# Patient Record
Sex: FEMALE | Race: WHITE
[De-identification: names, ages, dates, MRNs, and addresses within clinical notes are randomized per-mention and may not be internally consistent; named-entity substitution may affect disease eponyms.]

---

## 2016-02-25 VITALS
SYSTOLIC BLOOD PRESSURE: 117 MMHG | DIASTOLIC BLOOD PRESSURE: 93 MMHG | DIASTOLIC BLOOD PRESSURE: 93 MMHG | SYSTOLIC BLOOD PRESSURE: 117 MMHG | DIASTOLIC BLOOD PRESSURE: 93 MMHG | DIASTOLIC BLOOD PRESSURE: 93 MMHG | SYSTOLIC BLOOD PRESSURE: 117 MMHG | SYSTOLIC BLOOD PRESSURE: 117 MMHG

## 2019-12-27 ENCOUNTER — HOSPITAL ENCOUNTER (OUTPATIENT)
Dept: HOSPITAL 61 - ECHO | Age: 64
Discharge: HOME | End: 2019-12-27
Attending: INTERNAL MEDICINE
Payer: MEDICARE

## 2019-12-27 DIAGNOSIS — I11.9: Primary | ICD-10-CM

## 2019-12-27 DIAGNOSIS — E78.5: ICD-10-CM

## 2019-12-27 DIAGNOSIS — Z87.891: ICD-10-CM

## 2019-12-27 DIAGNOSIS — I48.91: ICD-10-CM

## 2019-12-27 PROCEDURE — 93306 TTE W/DOPPLER COMPLETE: CPT

## 2019-12-27 NOTE — CARD
MR#: [id number]

Account#: [id number]

Accession#: [id number]

Date of Study: 12/27/2019

Ordering Physician: KASI GENAO, 

Referring Physician: KASI GENAO, 

Tech: Jessica House





--------------- APPROVED REPORT --------------





EXAM: Two-dimensional and M-mode echocardiogram with Doppler and color Doppler.



Other Information 

Quality : AverageHR: 51bpm



INDICATION

Atrial Fibrillation

Murmur 



RISK FACTORS

Hypertension 

Hyperlipidemia

Previous smoker



2D DIMENSIONS 

RVDd3.8 (2.9-3.5cm)Left Atrium(2D)3.9 (1.6-4.0cm)

IVSd1.2 (0.7-1.1cm)Aortic Root(2D)3.2 (2.0-3.7cm)

LVDd4.6 (3.9-5.9cm)LVOT Diameter2.2 (1.8-2.4cm)

PWd1.0 (0.7-1.1cm)LVDs3.1 (2.5-4.0cm)

FS (%) 32.6 %SV59.8 ml



Aortic Valve

AoV Peak Vikash.167.0cm/sAoV VTI36.4cm

AO Peak GR.11.2mmHgLVOT Peak Vikash.128.3cm/s

LVOT  VTI 28.97cmAO Mean GR.5mmHg

MARY JO (VMAX)2.89lk6TZK   (VTI)2.98cm2

AI P 1/2 Pczc781kq



Mitral Valve

MV E Kssqzeyh69.3cm/sMV DECEL [id number]

MV A Prgupaqa58.0cm/sMV E Mean Gr.2mmHg

MV IFE61ewC/A  Ratio1.0

MVA (PHT)3.85cm2



TDI

E/Lateral E'13.7E/Medial E'14.6



Pulmonary Valve

PV Peak Peecgvpq131.9cm/sPV Peak Grad.5mmHg



Tricuspid Valve

TR P. Xxueszil540ut/sRAP ZXBSEBRM4keCq

TR Peak Gr.40znNuYISH40xkSe



Pulmonary Vein

S1 Yykfdxbp45.4cm/sD2 Uqdxrsrt10.9cm/s

PVa jcfvfzdc597tpby



 LEFT VENTRICLE 

The left ventricle is normal size. There is mild concentric left ventricular hypertrophy. The left ve
ntricular systolic function is normal and the ejection fraction is within normal range. The Ejection 
Fraction is 55-60%. There is normal LV segmental wall motion. Transmitral Doppler flow pattern is Gra
de I-abnormal relaxation pattern.



 RIGHT VENTRICLE 

The right ventricle is normal size. There is normal right ventricular wall thickness. The right ventr
icular systolic function is normal.



 ATRIA 

The left atrium size is normal. The right atrium size is normal. The interatrial septum is intact wit
h no evidence for an atrial septal defect or patent foramen ovale as noted on 2-D or Doppler imaging.




 AORTIC VALVE 

The aortic valve is thickened but opens well. Doppler and Color Flow revealed trace aortic regurgitat
ion. There is no significant aortic valvular stenosis.



 MITRAL VALVE 

The mitral valve is normal in structure and function. There is no evidence of mitral valve prolapse. 
There is no mitral valve stenosis. Doppler and Color-flow revealed trace mitral regurgitation.



 TRICUSPID VALVE 

The tricuspid valve is normal in structure and function. Doppler and Color Flow revealed trace tricus
pid regurgitation with an estimated PAP of 32 mmHg. There is no tricuspid valve stenosis.



 PULMONIC VALVE 

The pulmonic valve is not well visualized. Doppler and Color Flow revealed trace pulmonic valvular re
gurgitation.



 GREAT VESSELS 

The aortic root is normal in size. The IVC is normal in size and collapses >50% with inspiration.



 PERICARDIAL EFFUSION 

There is no evidence of significant pericardial effusion.



Critical Notification

Critical Value: No



<Conclusion>

The left ventricle is normal size.

The left ventricular systolic function is normal and the ejection fraction is within normal range.

The Ejection Fraction is 55-60%.

There is mild concentric left ventricular hypertrophy.

Doppler and Color Flow revealed trace aortic regurgitation.

There is no significant aortic valvular stenosis.

Doppler and Color-flow revealed trace mitral regurgitation.

Doppler and Color Flow revealed trace tricuspid regurgitation with an estimated PAP of 32 mmHg.



Signed by : Kasi Genao MD

Electronically Approved : 12/27/2019 11:06:07

## 2020-06-02 ENCOUNTER — HOSPITAL ENCOUNTER (OUTPATIENT)
Dept: HOSPITAL 61 - MAMMO | Age: 65
Discharge: HOME | End: 2020-06-02
Attending: INTERNAL MEDICINE
Payer: MEDICARE

## 2020-06-02 DIAGNOSIS — Z12.31: Primary | ICD-10-CM

## 2020-06-02 PROCEDURE — 77067 SCR MAMMO BI INCL CAD: CPT

## 2020-06-02 PROCEDURE — 77063 BREAST TOMOSYNTHESIS BI: CPT

## 2020-06-02 NOTE — RAD
DATE: 6/2/2020 9:16 AM



EXAM: MAMMO DINESH SCREENING BILATERAL



HISTORY:  Screening



COMPARISON: 9/15/2017



Bilateral CC and MLO views of the breasts were performed. Bilateral breast

tomosynthesis was performed in CC and MLO projections.



This study was interpreted with the benefit of Computerized Aided Detection

(CAD).





FINDINGS:



Breast Density: FATTY  The Breast Parenchyma is primarily fatty replaced.

Breast parenchyma level density A.





No suspicious masses, microcalcifications or architectural distortion is

present to suggest malignancy in either breast.





The visualized axillae are unremarkable. 



IMPRESSION: No mammographic evidence of malignancy. 



BI-RADS CATEGORY: 1 NEGATIVE



RECOMMENDED FOLLOW-UP: 12M 12 MONTH FOLLOW-UP Annual screening mammography is

recommended, unless clinically indicated sooner based on symptoms or change in

physical exam.



PQRS compliance statement: Patient information was entered into a reminder

system with a target due date 6/3/2021 for the next mammogram.



Mammography is a sensitive method for finding small breast cancers, but it

does not detect them all and is not a substitute for careful clinical

examination.  A negative mammogram does not negate a clinically suspicious

finding and should not result in delay in biopsying a clinically suspicious

abnormality.



"Our facility is accredited by the American College [employer] Mammography

Program."

## 2020-07-14 ENCOUNTER — HOSPITAL ENCOUNTER (OUTPATIENT)
Dept: HOSPITAL 61 - KCIC MRI | Age: 65
Discharge: HOME | End: 2020-07-14
Attending: ORTHOPAEDIC SURGERY
Payer: MEDICARE
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
## 2020-07-14 DIAGNOSIS — M43.12: ICD-10-CM
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
## 2020-07-14 DIAGNOSIS — S13.160A: Primary | ICD-10-CM

## 2020-07-14 DIAGNOSIS — M21.821: ICD-10-CM
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
## 2020-07-14 DIAGNOSIS — M48.02: ICD-10-CM
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
## 2020-07-14 DIAGNOSIS — M50.31: ICD-10-CM

## 2020-07-14 DIAGNOSIS — M75.101: ICD-10-CM

## 2020-07-14 PROCEDURE — 73221 MRI JOINT UPR EXTREM W/O DYE: CPT

## 2020-07-14 PROCEDURE — 72141 MRI NECK SPINE W/O DYE: CPT

## 2020-07-14 NOTE — KCIC
MRI Cervical Spine Without Contrast

 

History:Neck pain for one year, right shoulder pain for 6 to 8 months

 

Technique: Multiplanar, multi sequential noncontrast MR imaging was 

performed of the cervical spine.

 

Comparison: None

 

Findings: There is motion degradation. Cervical cord caliber is within 

normal limits without defined or expansile signal abnormality. Cervical 

vertebral body stature is maintained. There is very minimal posterior 

subluxation of C5 relative to C6 and minimal grade 1 anterior 

spondylolisthesis C3-4, negligible anterior spondylolisthesis at C4-5. 

There is moderate to severe degenerative disc disease C5-6, minimally 

C3-4, mild disc desiccation C4-5 and C6-7. There is anterior annular tear 

at C4-5. There is no significant marrow edema.

 

C2-C3:  Neural foramina and spinal canal are adequate.

 

C3-C4:  There is severe left facet degenerative change. There is mild left

uncovertebral degenerative change. There is moderate to severe narrowing 

of the left neural foramen. Right neural foramen and spinal canal are 

adequate. 

 

C4-C5:  There is very shallow posterior central protrusion. Central canal 

is borderline about 10 mm. There is facet degenerative change greater on 

the left. Neural foramina are not significantly narrowed, very mild 

narrowing on the right.

 

C5-C6:  There is broad posterior protrusion about 2 to 3 mm AP. Central 

canal is narrowed to about 8 to 9 mm. There is bilateral facet 

degenerative change. There is uncovertebral degenerative change greater on

the right. Poorly characterized due to motion, there is probably a small 

protrusion at the anterior proximal aspect of the right neural foramen. 

There is suspected moderate to severe narrowing of the proximal right 

neural foramen. There is likely mild narrowing of the left neural foramen.

 

C6-C7:   There is disc osteophyte complex and superimposed about 2 mm AP 

protrusion with mild indentation upon the ventral thecal sac greatest 

centrally. Central canal is minimally narrowed about 9 mm. There is facet 

degenerative change. There is mild narrowing of the left neural foramen, 

right neural foramen overall adequate.

 

C7-T1:  Neural foramina and spinal canal are adequate.

 

Impression: 

1.  There is degenerative disc disease greatest C5-6. There is spinal 

stenosis about 8 to 9 mm at C5-6 and to a somewhat lesser degree at C6-7.

2. Facet and uncovertebral degenerative change contributes to neural 

foramina compromise as stated greatest on the left at C3-4 and on the 

right at C5-6 although there is also possible small protrusion at the 

anterior proximal aspect of the right C5-6 neural foramen.

3. There is multilevel mild abnormal alignment as stated.

 

Electronically signed by: Yaya Hearn MD (7/14/2020 9:25 AM) [id number]

## 2020-07-14 NOTE — KCIC
EXAM: MRI right shoulder

 

DATE: 7/14/2020 8:45 AM

 

COMPARISON: 7/6/2020

 

INDICATION: RIGHT SHOULDER PAIN-Right shoulder pain x 6-8 months.

 

TECHNIQUE: Multiplanar, multisequence MRI of the right shoulder was 

performed without contrast.

 

FINDINGS:

AC joint degenerative changes are seen. Type II acromion. No os acromiale.

 

 

Subacromial subdeltoid bursal fluid from full-thickness rotator cuff tear 

described below. There is a very small right shoulder joint effusion.

 

There is a full-thickness, subtotal width tear of the supraspinatus 

tendon. Anteriorly the fluid defect measures 1.5 cm in AP dimension 

posteriorly, heterogeneous signal is seen, likely representing scar tissue

extending additional 1.6 cm. Tendinous retraction to the AC joint There is

also a partial-thickness articular sided tear of the infraspinatus tendon 

posteriorly measuring approximately 7 mm in AP dimension. Subscapularis 

and infraspinatus tendinosis.

 

Extra-articular long head biceps tendon is seen within the bicipital 

groove. Increased signal and thickening of the intra-articular segment of 

the long head biceps tendon consistent with moderate tendinosis.

 

Diffuse deformity of the labrum likely from chronic degeneration although 

no obvious acute tear is identified. Chondral thinning anteriorly within 

the glenoid. No fracture or osteonecrosis.

 

IMPRESSION: 

1.  Full-thickness, subtotal width tear of the supraspinatus tendon with 

heterogeneous scar tissue posteriorly.

2.  Partial-thickness articular sided tear of the posterior infraspinatus 

tendon involving approximately 50 percent tendon thickness

3.  Intra-articular long head biceps tendinosis.

 

Electronically signed by: Liu Johnston MD (7/14/2020 9:56 AM) [id number]

## 2020-09-08 ENCOUNTER — HOSPITAL ENCOUNTER (OUTPATIENT)
Dept: HOSPITAL 61 - LAB | Age: 65
Discharge: HOME | End: 2020-09-08
Attending: ORTHOPAEDIC SURGERY
Payer: MEDICARE

## 2020-09-08 DIAGNOSIS — Z20.828: Primary | ICD-10-CM

## 2020-09-11 ENCOUNTER — HOSPITAL ENCOUNTER (OUTPATIENT)
Dept: HOSPITAL 61 - SURG | Age: 65
Discharge: HOME | End: 2020-09-11
Attending: ORTHOPAEDIC SURGERY
Payer: MEDICARE

## 2020-09-11 VITALS — BODY MASS INDEX: 25.28 KG/M2 | WEIGHT: 149.91 LBS | HEIGHT: 64.5 IN

## 2020-09-11 VITALS
DIASTOLIC BLOOD PRESSURE: 56 MMHG | DIASTOLIC BLOOD PRESSURE: 56 MMHG | DIASTOLIC BLOOD PRESSURE: 56 MMHG | SYSTOLIC BLOOD PRESSURE: 128 MMHG | SYSTOLIC BLOOD PRESSURE: 128 MMHG | DIASTOLIC BLOOD PRESSURE: 56 MMHG | SYSTOLIC BLOOD PRESSURE: 128 MMHG | SYSTOLIC BLOOD PRESSURE: 128 MMHG

## 2020-09-11 DIAGNOSIS — Z79.82: ICD-10-CM

## 2020-09-11 DIAGNOSIS — M67.813: ICD-10-CM

## 2020-09-11 DIAGNOSIS — Z87.891: ICD-10-CM

## 2020-09-11 DIAGNOSIS — I11.0: ICD-10-CM

## 2020-09-11 DIAGNOSIS — M75.121: Primary | ICD-10-CM

## 2020-09-11 DIAGNOSIS — E78.5: ICD-10-CM

## 2020-09-11 DIAGNOSIS — M19.011: ICD-10-CM

## 2020-09-11 DIAGNOSIS — Z79.899: ICD-10-CM

## 2020-09-11 DIAGNOSIS — M75.41: ICD-10-CM

## 2020-09-11 DIAGNOSIS — I50.9: ICD-10-CM

## 2020-09-11 PROCEDURE — 29826 SHO ARTHRS SRG DECOMPRESSION: CPT

## 2020-09-11 PROCEDURE — 29824 SHO ARTHRS SRG DSTL CLAVICLC: CPT

## 2020-09-11 PROCEDURE — C1782 MORCELLATOR: HCPCS

## 2020-09-11 PROCEDURE — 23410 REPAIR ROTATOR CUFF ACUTE: CPT

## 2020-09-11 PROCEDURE — C1713 ANCHOR/SCREW BN/BN,TIS/BN: HCPCS

## 2020-09-11 PROCEDURE — A7015 AEROSOL MASK USED W NEBULIZE: HCPCS

## 2020-09-11 PROCEDURE — 23430 REPAIR BICEPS TENDON: CPT

## 2020-09-11 NOTE — PDOC4
Operative Note


Operative Note


Date of Procedure:  September 11, 2020


Pre-Op Diagnosis:   


* Complete tear of right rotator cuff, unspecified whether traumatic - M75.121 


* Biceps tendinosis of right shoulder - M67.813 


* Impingement syndrome of right shoulder - M75.41


* Primary osteoarthritis, right shoulder acromioclavicular joint - M19.011  


Post-Op Diagnosis:   


* Complete tear of right rotator cuff, unspecified whether traumatic - M75.121 


* Biceps tendinosis of right shoulder - M67.813 


* Impingement syndrome of right shoulder - M75.41


* Primary osteoarthritis, right shoulder acromioclavicular joint - M19.011  


Procedure:      


* Right shoulder, repair of ruptured musculotendinous cuff (rotator cuff) open, 

  acute CPT 70268


* Right shoulder open biceps tenodesis CPT 47885


* Arthroscopy, shoulder, surgical; decompression of subacromial space with 

  partial acromioplasty CPT 23097


* Arthroscopy, shoulder, surgical; distal claviculectomy including distal 

  articular surface (Donna procedure) CPT 44245


Surgeon:  Patricia Newman MD


Assistant:  ROB Haro


Anesthesia:  General


EBL:  50 mL


Specimens Obtained:  none


Complications:  none


Drains:  none





Findings: Full-thickness rotator cuff tear of the supraspinatus tendon and 

partial tearing of the infraspinatus tendon.  Impingement occurring due to 

prominent acromion.  Prominent arthritic acromioclavicular joint.  She had bony 

prominences and spurs at the humeral footprint which were removed with a 

rongeurs.





Implants: Arthrex swivel lock anchors 4.75 mm x 4, Arthrex 7 mm Biceps Tenodesis

Swivelock.





Indications for Procedure: 65 year old woman with progressive right shoulder 

pain over the past 8-12 months. Patient notices pain sleeping on her side, 

reaching overhead, and holding a gallon of milk.  On examination she has 

impingement syndrome, probable rotator cuff tear, bicipital tenosynovitis, and 

painful acromioclavicular joint osteoarthritis which is also causing 

impingement.  I recommended arthroscopy with subacromial decompression, distal 

clavicle excision, biceps tenodesis and rotator cuff repair.  There is a chance 

the cuff repair will not be repairable and I discussed the possibility of using 

an allograft and perform superior capsule reconstruction if the tear cannot be 

repaired.  We discussed the potential risks of infection, neurovascular injury, 

fracture, bleeding, or other potential surgical or anesthetic complications. We 

also discussed healing expectations including postoperative use of DonJoy sling,

need for physical therapy, and refrain from overhead lifting for 3 months; I 

described that this is a more difficult surgery than many common surgeries, and 

she may take up to a year to heal. All of her questions were answered and she 

desires to proceed with surgery.





Procedure in Detail: The patient was identified in the preoperative holding 

area.  The correct right shoulder was marked by me.  The patient was taken to 

the operating room where general anesthesia was used.  The patient was 

positioned in the beachchair position with the bony prominences well-padded and 

the eyes protected. Preoperative antibiotics were given intravenously.  A 

timeout procedure was performed. Under sterile technique 20 mL of bupivacaine 

with epinephrine was injected into the subacromial space and glenohumeral joint.

The limb was then thoroughly prepared with surgical ChloraPrep solution 

circumferentially. Sterile waterproof arthroscopy shoulder drapes were applied, 

along with an impervious stockinette over the arm, and a Spider arm beltran.





Posterior, posterolateral, lateral, and anterior arthroscopy portals were used. 

The glenohumeral joint showed normal articular surfaces with minor labral 

degeneration but no need for extensive debridement.  The biceps tendon was 

palpated with the shaver, and retracted into the joint, and the tendon is 

visually torn about FPC through, and the remaining tendon is of poor quality

with tendinosis.  I completed the partial tear of the biceps with the [employer] 

Edge thermal energy bipolar device in preparation for the tenodesis.  The distal

portion of the supraspinatus is detached from the humeral head, and the 

footprint is exposed.  This is a full-thickness tear of the supraspinatus.  





The subacromial space was entered. The anterior acromion was prominent and the 

subacromial space was narrowed.  The [employer] Edge thermal energy bipolar device 

was used for hemostasis and to resect the undersurface periosteum exposing the 

prominent anterior acromion.  A 6.0 mm oval antolin was used for the acromioplasty.

A three-stage acromioplasty was performed, with the antolin first laterally, 

removing anterior acromion, using the distal clavicle as a reference. The antolin 

was then placed in the posterior portal, and a cutting block technique was used 

for smoothing of the lateral edge of the acromion tapering the anterior acromion

into a Bigliani type I configuration. Final smoothing of the acromion was 

performed with the antolin again in the lateral portal, and direct arthroscopic 

visualization. The impingement of the subacromial space was now nicely 

decompressed. 





No further impingement appears to be occurring from the acromion, however the 

arthritic distal clavicle is degenerative with an osteoarthritic distal clavicle

articular surface.  The antolin was used to resect the entire articular surface of 

the distal clavicle and 10 mm of distal clavicle bone, completing the [place] 

arthroscopic distal clavicle excision.  The [employer] thermal energy device 

was used for hemostasis.





The cuff tear was easily visualized and appears repairable, without need for 

superior capsule reconstruction.  The arthroscopic instruments were removed. 

Antibiotics were redosed. Outer gloves were changed. The skin was prepared a 

second time with ChloraPrep solution. An anterior lateral deltoid raphae 

splinting incision was used. Care was made not to extend more than 4 cm distally

so as to avoid axillary nerve injury. Self-retaining retractors were placed. My 

assistant used an Dubakiy retractor in addition to the self-retaining 

retractors.  





A rongeurs and a bone punch were used to decorticate the supraspinatus 

footprint, and create a "crimson duvet".  The initial portion of the repair was 

from the supraspinatus to the infraspinatus in a side-to-side fashion.  I used 

the scorpion device to place figure-of-eight sutures, repairing the posterior V 

split into the infraspinatus and the side to side fashion for secure repair of 

that infraspinatus tear, and now leaving essentially a large crescent tear of 

the supraspinatus and very anterior edge of the infraspinatus to the footprint. 

The 2 medial 4.75 mm swivel lock anchors with swaged suture tapes were used, but

due to her soft bone I used a smaller bone punch for all of the anchor 

placements.  All of the medial sutures were deployed with Arthrex scorpion 

device, about 16 mm from the distal edge of the cuff.  The medial mattress 

sutures were secured and tied, and Obey my assistant held tension reducing the 

cuff while the sutures were tied, and these medial mattress sutures were placed 

in a ripstop pattern to prevent the tapes from tearing through the abnormal cuff

tissue.  It would have been impossible to debride and remove all of the abnormal

tissue as there would have been no cuff left to repair.  The swaged suture tapes

were then trimmed, the tapes were crossed, and the 2 lateral row anchors were 

now placed on the humeral cortex for secure speed bridge repair.  The additional

sutures from the lateral row anchors were used anteriorly and posteriorly to 

secure the edges of the dog ear of the tear. A secure and tension-free repair 

was obtained. The shoulder was taken through a range of motion, and the repair 

security confirmed.





Copious saline irrigation was used. I closed the fascia of the deltoid with #1 

Vicryl suture in a figure-of-eight fashion. My assistant Mitchel then completed 

the subcutaneous closure with 2-0 Vicryl.  He closed the portals with #3-0 

Prolene.  He repaired the skin incision with #3-0 Stratafix, Mastisol and Steri-

Strips. He injected an additional 30 mL of bupivacaine with epinephrine.  

Xeroform was used over the portals.  A bulky sterile dressing was applied.  A 

DonJoy UltraSling was applied.  There were no apparent complications.











PATRICIA NEWMAN MD                 Sep 11, 2020 10:08

## 2021-06-07 ENCOUNTER — HOSPITAL ENCOUNTER (OUTPATIENT)
Dept: HOSPITAL 61 - MAMMO | Age: 66
End: 2021-06-07
Attending: INTERNAL MEDICINE
Payer: MEDICARE

## 2021-06-07 DIAGNOSIS — Z12.31: Primary | ICD-10-CM

## 2021-06-07 PROCEDURE — 77063 BREAST TOMOSYNTHESIS BI: CPT

## 2021-06-07 PROCEDURE — 77067 SCR MAMMO BI INCL CAD: CPT

## 2021-06-07 NOTE — RAD
EXAM: BILATERAL DIGITAL SCREENING MAMMOGRAPHY.



HISTORY: Routine mammographic screening. 



TECHNIQUE: Bilateral full field digital images were obtained in CC and MLO projections. Computer-aide
d detection was applied.



COMPARISON: 06/02/2020, 09/15/2017, 01/27/2017.



COMPOSITION: B. There are scattered areas of fibroglandular density.



FINDINGS: Circumscribed nodules bilaterally are stable and benign. A coarse calcification on the left
 is benign. There are no suspicious masses, microcalcifications or architectural distortion. The pare
nchymal pattern is stable.



BI-RADS CATEGORY 2: Benign.



RECOMMENDATION:

1. Routine screening mammography in one year.



If mammography demonstrates dense breast tissue (heterogenously dense or extremely dense, category C 
or D), which could hide abnormalities, and if other risk factors for breast cancer have been identifi
ed, supplemental screening tests that may be suggested by the ordering physician may be of benefit. D
ense breast tissue, in and of itself, is a relatively common condition. Therefore, this information i
s not provided to cause undue concern, but rather to raise awareness and to promote discussion with t
he referring physician regarding the presence of other risk factors, in addition to dense breast tiss
ue. The results of this mammography examination is provided to the patient and referring physician. T
he patient should contact their referring physician if any questions or concerns exist regarding this
 report.



PQRS compliance statement -  Patient information was entered into a reminder system with a target due
 date for the next mammogram. 



"Our facility is accredited by the American College of [employer] Mammography Program."



Electronically signed by: CYNTHIA Martinez MD (6/7/2021 12:17 PM) [facility]

## 2021-07-06 ENCOUNTER — HOSPITAL ENCOUNTER (OUTPATIENT)
Dept: HOSPITAL 61 - NM | Age: 66
End: 2021-07-06
Attending: INTERNAL MEDICINE
Payer: MEDICARE

## 2021-07-06 DIAGNOSIS — R07.89: Primary | ICD-10-CM

## 2021-07-06 PROCEDURE — 78452 HT MUSCLE IMAGE SPECT MULT: CPT

## 2021-07-06 PROCEDURE — A9500 TC99M SESTAMIBI: HCPCS

## 2021-07-06 PROCEDURE — 93017 CV STRESS TEST TRACING ONLY: CPT

## 2021-07-06 NOTE — RAD
MR#: [id number]

Account#: [id number]

Accession#: [id number]

Date of Study: 07/06/2021

Ordering Physician: KASI ENRIQUEZ, 

Referring Physician: SAMUEL LAROSE Tech: RT JOHN Cononrs) (N)





--------------- APPROVED REPORT --------------





Test Type:          Pharmacological

Stress Nurse/Tech: SAUL Boo RN

Test Indications: Chest Pressure and Left shoulder pain

Cardiac History: HTN, See EMR.

Medications:     See EMR.

Medical History: X-Smoker=Quit 10 yrs ago, See EMR.

Resting ECG:     SR

Resting Heart Rate: 51 bpm

Resting Blood Pressure: 129/57mmHg

Pretest Chest Pain: No chest pain



Nurse/Tech Notes

Lungs CTA, Heart tones regular.

Consent: The procedure was explained to the patient in lay terms. Informed consent was witnessed. Dillan
eout was entered into [employer]. History and Stress Test performed by RT Waylon (R) (N)



Pharm. Details

Pharmacologic stress testing was performed using 0.4mg per 5ml of regadenoson given intravenously ove
r 7-10 seconds.





POST EXERCISE

Reason for Termination: Infusion complete

Max HR: 83 bpm

Max Blood Pressure: 134/56mmHg

Blood Pressure response to exercise: Normal blood pressure response during stress.

Heart Rate response to exercise: WNL

Chest Pain: No. 

Arrhythmia: No. 

ST Change: No. 



INTERPRETATION

Stress EKG Conclusion: No evidence of stress induced EKG changes. 



Imaging Protocol

IMAGE PROTOCOL: Rest Tc-99m/stress Tc-99m 1 day



Rest:            Stress:         Viability:   

Radiopharm.Tc99m DszzrehlnOb78k Sestamibi

Dose10.5mCi            30.8mCi            

Duration    13min.           13min.           

Img Date  07/06/2021 07/06/2021      

Inj-Img [id number].           60min.           



Rest Admin Site:IV - Right AntecubitalAdministrator:RT Waylon (R)(N)

Stress Admin Site: IV - Right AntecubitalAdministrator: RT Waylon (R)(N)



STRESS DATA

End Diast. Vol.90.0mlLVEDV index BSA50.0ml

End Syst. Vol.18.0mlLVESV index BSA10.0ml

Myocardial Fvuv098.0gEject. Ujjffcqr04.0%



Stress Scores

Regional WT0.00Summed WT1.00

Regional WM0.00Summed WM0.00



The rest and stress images show normal perfusion, normal contraction and thickening.



LV Perf. Quant

17 Seg. SSS0.00

17 Seg. SRS3.00

17 Seg. SDS0.00

Stress Defect Extent (% LAD)0.00Rest Defect Extent (% LAD)0.00Rev. Defect Extent (% LAD)0.00

Stress Defect Extent (% LCX) 0.00Rest Defect Extent (% LCX)0.00Rev. Defect Extent (% LCX)0.00

Stress Defect Extent (% RCA)0.00Rest Defect Extent (% RCA)0.00Rev. Defect Extent (% RCA)0.00

Stress Defect Extent (% EASTON)0.00Rest Defect Extent (% EASTON)0.00Rev. Defect Extent (% EASTON)0.00



Other Information

Quality:Average

Risk Assessment: Low Risk



Conclusion

1. No evidence of EKG changes with stress testing.

2. Normal perfusion at stress/rest.

3. Low risk study.

4. EF > 60%.



Signed by : Manuel Bernard, 

Electronically Approved : 07/06/2021 18:33:49

## 2021-10-21 ENCOUNTER — HOSPITAL ENCOUNTER (OUTPATIENT)
Dept: HOSPITAL 61 - KCIC MRI | Age: 66
End: 2021-10-21
Attending: ORTHOPAEDIC SURGERY
Payer: MEDICARE

## 2021-10-21 DIAGNOSIS — Z98.890: ICD-10-CM

## 2021-10-21 DIAGNOSIS — M25.811: ICD-10-CM

## 2021-10-21 DIAGNOSIS — M25.411: ICD-10-CM

## 2021-10-21 DIAGNOSIS — M87.821: Primary | ICD-10-CM

## 2021-10-21 PROCEDURE — 73221 MRI JOINT UPR EXTREM W/O DYE: CPT

## 2021-10-21 NOTE — [facility]
EXAM: MRI RIGHT SHOULDER WITHOUT CONTRAST



INDICATION: Post rotator cuff repair. Evaluate surgical integrity one year later. Shoulder pain and l
imited range of motion



COMPARISON: MRI right shoulder 7/14/2020



TECHNIQUE: Multiplanar, multisequence imaging of the right shoulder without contrast.



FINDINGS: 

ROTATOR CUFF: There are surgical changes of rotator cuff repair. The repaired anterior supraspinatus 
tendon is thinned in appearance but appears intact. The posterior supraspinatus tendon and infraspina
tus tendon are very irregular and thickened with increased signal, similar or slightly worsened rafael
red to 7/14/2020, but there is no definite full-thickness tear. The subscapularis tendon is intact. T
eres minor tendon is intact. No rotator cuff muscle atrophy or edema.



LABRUM: Limited evaluation of the superior labrum due to motion artifact. No definite new abnormality
.



BICEPS TENDON: Surgical changes of biceps tenodesis.



ACROMIOCLAVICULAR JOINT: Surgical changes of acromioplasty.



GLENOHUMERAL JOINT: Evaluation of cartilage is limited due to motion artifact. No definite full-thick
ness cartilage loss. There is a new subchondral T1 hypointense, T2 hyperintense crescentic line at th
e superomedial femoral head consistent with avascular necrosis. This involves the area of about 1.6 x
 1.6 cm coronal by sagittal diameter. No flattening of the humeral head.



OTHER: There is a small joint effusion and fluid in the subacromial-subdeltoid bursa..



IMPRESSION:

1. New avascular necrosis of the humeral head.



2. Surgical changes of rotator cuff repair. The anterior supraspinatus tendon is intact. The posterio
r supraspinatus and infraspinatus tendons are very irregular with increased signal, unchanged to slig
htly worsened from prior exam, but there is no definite full-thickness tear.



3. Additional surgical changes as described.



Electronically signed by: Haley Ashley MD (10/21/2021 3:52 PM) [facility]